# Patient Record
Sex: FEMALE | Race: BLACK OR AFRICAN AMERICAN | NOT HISPANIC OR LATINO | Employment: OTHER | URBAN - METROPOLITAN AREA
[De-identification: names, ages, dates, MRNs, and addresses within clinical notes are randomized per-mention and may not be internally consistent; named-entity substitution may affect disease eponyms.]

---

## 2021-05-19 PROBLEM — Z79.4 TYPE 2 DIABETES MELLITUS WITH CHRONIC KIDNEY DISEASE ON CHRONIC DIALYSIS, WITH LONG-TERM CURRENT USE OF INSULIN: Status: ACTIVE | Noted: 2021-05-19

## 2021-05-19 PROBLEM — N20.0 KIDNEY STONES: Status: ACTIVE | Noted: 2017-10-16

## 2021-05-19 PROBLEM — N18.6 TYPE 2 DIABETES MELLITUS WITH CHRONIC KIDNEY DISEASE ON CHRONIC DIALYSIS, WITH LONG-TERM CURRENT USE OF INSULIN: Status: ACTIVE | Noted: 2021-05-19

## 2021-05-19 PROBLEM — E61.1 IRON DEFICIENCY: Status: ACTIVE | Noted: 2017-08-03

## 2021-05-19 PROBLEM — R80.9 PROTEINURIA: Status: ACTIVE | Noted: 2017-08-03

## 2021-05-19 PROBLEM — E11.22 TYPE 2 DIABETES MELLITUS WITH CHRONIC KIDNEY DISEASE ON CHRONIC DIALYSIS, WITH LONG-TERM CURRENT USE OF INSULIN: Status: ACTIVE | Noted: 2021-05-19

## 2021-05-19 PROBLEM — I10 ESSENTIAL HYPERTENSION: Status: ACTIVE | Noted: 2021-05-19

## 2021-05-19 PROBLEM — N18.6 ESRD (END STAGE RENAL DISEASE): Status: ACTIVE | Noted: 2018-08-01

## 2021-05-19 PROBLEM — N20.1 URETERIC STONE: Status: ACTIVE | Noted: 2021-05-19

## 2021-05-19 PROBLEM — S82.831A CLOSED FRACTURE OF RIGHT DISTAL FIBULA: Status: ACTIVE | Noted: 2021-05-19

## 2021-05-19 PROBLEM — N25.81 SECONDARY HYPERPARATHYROIDISM: Status: ACTIVE | Noted: 2017-08-03

## 2021-05-19 PROBLEM — S82.831A CLOSED FRACTURE OF RIGHT DISTAL FIBULA: Status: RESOLVED | Noted: 2021-05-19 | Resolved: 2021-05-19

## 2021-05-19 PROBLEM — D50.8 IRON DEFICIENCY ANEMIA SECONDARY TO INADEQUATE DIETARY IRON INTAKE: Status: ACTIVE | Noted: 2017-08-03

## 2021-05-19 PROBLEM — S82.841D CLOSED BIMALLEOLAR FRACTURE OF RIGHT ANKLE WITH ROUTINE HEALING: Status: ACTIVE | Noted: 2021-05-19

## 2021-05-19 PROBLEM — Z01.818 ENCOUNTER FOR PREOPERATIVE ASSESSMENT: Status: ACTIVE | Noted: 2021-05-19

## 2021-05-19 PROBLEM — Z99.2 TYPE 2 DIABETES MELLITUS WITH CHRONIC KIDNEY DISEASE ON CHRONIC DIALYSIS, WITH LONG-TERM CURRENT USE OF INSULIN: Status: ACTIVE | Noted: 2021-05-19

## 2021-05-19 PROBLEM — E66.01 MORBID OBESITY: Status: ACTIVE | Noted: 2021-05-19

## 2021-05-20 PROBLEM — E83.39 HYPERPHOSPHATEMIA: Status: ACTIVE | Noted: 2021-05-20

## 2021-05-21 PROBLEM — S82.831A CLOSED FRACTURE OF RIGHT DISTAL FIBULA: Status: ACTIVE | Noted: 2021-05-21

## 2021-05-21 PROBLEM — S82.841A CLOSED BIMALLEOLAR FRACTURE OF RIGHT ANKLE: Status: ACTIVE | Noted: 2021-05-19

## 2021-05-22 PROBLEM — E87.5 HYPERKALEMIA: Status: ACTIVE | Noted: 2021-05-22

## 2021-05-24 PROBLEM — E87.5 HYPERKALEMIA: Status: RESOLVED | Noted: 2021-05-22 | Resolved: 2021-05-24

## 2021-05-24 PROBLEM — R53.83 LETHARGY: Status: ACTIVE | Noted: 2021-05-24

## 2021-05-24 PROBLEM — J96.11 CHRONIC RESPIRATORY FAILURE WITH HYPOXIA: Status: ACTIVE | Noted: 2021-05-24

## 2021-05-24 PROBLEM — S82.831A CLOSED FRACTURE OF RIGHT DISTAL FIBULA: Status: RESOLVED | Noted: 2021-05-21 | Resolved: 2021-05-24

## 2022-02-27 PROBLEM — Z79.899 DVT PROPHYLAXIS: Status: ACTIVE | Noted: 2021-05-19

## 2022-02-27 PROBLEM — R06.02 SOB (SHORTNESS OF BREATH): Status: ACTIVE | Noted: 2022-02-27

## 2022-02-27 PROBLEM — I16.1 HYPERTENSIVE EMERGENCY, NO CHF: Status: ACTIVE | Noted: 2022-02-27

## 2022-02-27 PROBLEM — E83.51 HYPOCALCEMIA: Status: ACTIVE | Noted: 2022-02-27

## 2022-02-27 PROBLEM — E11.9 TYPE 2 DIABETES MELLITUS: Status: ACTIVE | Noted: 2021-05-19

## 2022-03-03 PROBLEM — I16.1 HYPERTENSIVE EMERGENCY: Status: RESOLVED | Noted: 2022-02-27 | Resolved: 2022-03-03

## 2022-03-05 PROBLEM — E87.5 HYPERKALEMIA: Status: RESOLVED | Noted: 2021-05-22 | Resolved: 2022-03-05

## 2022-03-05 PROBLEM — Z79.899 DVT PROPHYLAXIS: Status: RESOLVED | Noted: 2021-05-19 | Resolved: 2022-03-05

## 2022-03-05 PROBLEM — I27.20 PULMONARY HYPERTENSION: Status: ACTIVE | Noted: 2022-03-05

## 2022-03-05 PROBLEM — R06.02 SOB (SHORTNESS OF BREATH): Status: RESOLVED | Noted: 2022-02-27 | Resolved: 2022-03-05

## 2023-09-25 PROBLEM — R09.89 SUSPECTED CEREBROVASCULAR ACCIDENT (CVA): Status: ACTIVE | Noted: 2023-09-25

## 2023-09-25 PROBLEM — E11.9 TYPE 2 DIABETES MELLITUS: Status: ACTIVE | Noted: 2023-09-25

## 2023-09-25 PROBLEM — G25.3 MYOCLONIC JERKING: Status: ACTIVE | Noted: 2023-09-25

## 2023-09-25 PROBLEM — E83.51 HYPOCALCEMIA: Status: ACTIVE | Noted: 2023-09-25

## 2023-09-25 PROBLEM — R41.82 ALTERED MENTAL STATUS: Status: ACTIVE | Noted: 2023-09-25

## 2023-09-25 PROBLEM — N18.6 ESRD (END STAGE RENAL DISEASE): Status: ACTIVE | Noted: 2023-09-25

## 2023-09-26 PROBLEM — E87.29 METABOLIC ACIDOSIS, INCREASED ANION GAP: Status: ACTIVE | Noted: 2023-09-26

## 2023-09-26 PROBLEM — E87.5 HYPERKALEMIA: Status: ACTIVE | Noted: 2023-09-26

## 2023-09-26 PROBLEM — N18.6 ESRD (END STAGE RENAL DISEASE) ON DIALYSIS: Status: ACTIVE | Noted: 2023-09-26

## 2023-09-26 PROBLEM — N19 UREMIA: Status: ACTIVE | Noted: 2023-09-26

## 2023-09-26 PROBLEM — I95.3 HEMODIALYSIS-ASSOCIATED HYPOTENSION: Status: ACTIVE | Noted: 2023-09-26

## 2023-09-26 PROBLEM — Z99.2 ESRD (END STAGE RENAL DISEASE) ON DIALYSIS: Status: ACTIVE | Noted: 2023-09-26

## 2023-09-26 PROBLEM — R56.9 SEIZURE: Status: ACTIVE | Noted: 2023-09-26

## 2023-09-26 PROBLEM — J96.01 ACUTE HYPOXEMIC RESPIRATORY FAILURE: Status: ACTIVE | Noted: 2023-09-26

## 2023-09-27 PROBLEM — G93.41 ACUTE METABOLIC ENCEPHALOPATHY: Status: ACTIVE | Noted: 2023-09-27

## 2023-09-28 PROBLEM — G93.40 ENCEPHALOPATHY: Status: ACTIVE | Noted: 2023-09-28

## 2023-09-28 PROBLEM — R09.89 SUSPECTED CEREBROVASCULAR ACCIDENT (CVA): Status: RESOLVED | Noted: 2023-09-25 | Resolved: 2023-09-28

## 2023-09-29 PROBLEM — Z99.2 ESRD (END STAGE RENAL DISEASE) ON DIALYSIS: Status: ACTIVE | Noted: 2023-09-29

## 2023-09-29 PROBLEM — E83.39 HYPERPHOSPHATEMIA: Status: ACTIVE | Noted: 2023-09-29

## 2023-09-29 PROBLEM — N18.6 ESRD (END STAGE RENAL DISEASE) ON DIALYSIS: Status: ACTIVE | Noted: 2023-09-29

## 2023-09-29 PROBLEM — D63.1 ANEMIA DUE TO CHRONIC KIDNEY DISEASE, ON CHRONIC DIALYSIS: Status: ACTIVE | Noted: 2023-09-26

## 2023-09-30 PROBLEM — D63.1 ANEMIA IN ESRD (END-STAGE RENAL DISEASE): Status: ACTIVE | Noted: 2023-09-30

## 2023-09-30 PROBLEM — N18.6 ANEMIA IN ESRD (END-STAGE RENAL DISEASE): Status: ACTIVE | Noted: 2023-09-30

## 2023-09-30 PROBLEM — R06.02 SOB (SHORTNESS OF BREATH): Status: ACTIVE | Noted: 2023-09-30

## 2023-09-30 PROBLEM — I10 ESSENTIAL HYPERTENSION: Status: ACTIVE | Noted: 2023-09-30

## 2023-09-30 PROBLEM — N25.81 SECONDARY HYPERPARATHYROIDISM OF RENAL ORIGIN: Status: ACTIVE | Noted: 2023-09-30

## 2023-10-01 PROBLEM — E87.1 HYPONATREMIA: Status: ACTIVE | Noted: 2023-10-01

## 2023-10-02 PROBLEM — J18.9 HOSPITAL ACQUIRED PNA: Status: ACTIVE | Noted: 2023-10-02
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
## 2023-10-04 PROBLEM — E87.1 HYPONATREMIA: Status: RESOLVED | Noted: 2023-10-01 | Resolved: 2023-10-04

## 2023-10-04 PROBLEM — E83.51 HYPOCALCEMIA: Status: RESOLVED | Noted: 2023-09-25 | Resolved: 2023-10-04

## 2023-10-04 PROBLEM — J96.01 ACUTE HYPOXEMIC RESPIRATORY FAILURE: Status: RESOLVED | Noted: 2023-09-26 | Resolved: 2023-10-04

## 2023-10-04 PROBLEM — E87.29 METABOLIC ACIDOSIS, INCREASED ANION GAP: Status: RESOLVED | Noted: 2023-09-26 | Resolved: 2023-10-04

## 2023-10-04 PROBLEM — G93.40 ACUTE ENCEPHALOPATHY: Status: ACTIVE | Noted: 2023-09-25

## 2023-10-04 PROBLEM — R41.82 ALTERED MENTAL STATUS: Status: ACTIVE | Noted: 2023-10-04

## 2023-10-04 PROBLEM — N19 UREMIA: Status: RESOLVED | Noted: 2023-09-26 | Resolved: 2023-10-04

## 2023-10-05 PROBLEM — R53.81 PHYSICAL DECONDITIONING: Status: ACTIVE | Noted: 2023-10-05

## 2023-10-06 PROBLEM — J96.01 ACUTE HYPOXEMIC RESPIRATORY FAILURE: Status: ACTIVE | Noted: 2023-10-02

## 2023-10-06 PROBLEM — J18.9 HOSPITAL-ACQUIRED PNEUMONIA: Status: ACTIVE | Noted: 2023-10-06
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
## 2023-10-07 PROBLEM — I63.9 CEREBROVASCULAR ACCIDENT (CVA): Status: ACTIVE | Noted: 2023-10-07

## 2023-10-10 PROBLEM — R41.82 ALTERED MENTAL STATUS: Status: RESOLVED | Noted: 2023-10-04 | Resolved: 2023-10-10
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
## 2023-10-13 PROBLEM — J18.9 HOSPITAL-ACQUIRED PNEUMONIA: Status: RESOLVED | Noted: 2023-10-06 | Resolved: 2023-10-13
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
## 2023-10-13 PROBLEM — G93.40 ACUTE ENCEPHALOPATHY: Status: RESOLVED | Noted: 2023-09-25 | Resolved: 2023-10-13

## 2023-10-13 PROBLEM — I95.3 HEMODIALYSIS-ASSOCIATED HYPOTENSION: Status: RESOLVED | Noted: 2023-09-26 | Resolved: 2023-10-13

## 2023-10-13 PROBLEM — E87.5 HYPERKALEMIA: Status: RESOLVED | Noted: 2023-09-26 | Resolved: 2023-10-13

## 2023-10-13 PROBLEM — G93.41 ACUTE METABOLIC ENCEPHALOPATHY: Status: RESOLVED | Noted: 2023-09-27 | Resolved: 2023-10-13

## 2023-10-13 PROBLEM — J96.01 ACUTE HYPOXEMIC RESPIRATORY FAILURE: Status: RESOLVED | Noted: 2023-10-02 | Resolved: 2023-10-13

## 2023-10-13 PROBLEM — R06.02 SOB (SHORTNESS OF BREATH): Status: RESOLVED | Noted: 2023-09-30 | Resolved: 2023-10-13

## 2023-10-13 PROBLEM — J18.9 HOSPITAL ACQUIRED PNA: Status: ACTIVE | Noted: 2023-10-13

## 2023-10-18 PROBLEM — T82.590A DIALYSIS AV FISTULA MALFUNCTION: Status: ACTIVE | Noted: 2023-10-18

## 2024-01-08 PROBLEM — I63.9 CEREBROVASCULAR ACCIDENT (CVA): Status: RESOLVED | Noted: 2023-10-07 | Resolved: 2024-01-08

## 2024-01-15 PROBLEM — J18.9 HOSPITAL ACQUIRED PNA: Status: RESOLVED | Noted: 2023-10-13 | Resolved: 2024-01-15
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
## 2024-03-06 PROBLEM — T82.868A AV FISTULA THROMBOSIS, INITIAL ENCOUNTER: Status: ACTIVE | Noted: 2024-03-06

## 2024-03-09 PROBLEM — E87.5 HYPERKALEMIA: Status: ACTIVE | Noted: 2024-03-09

## 2024-03-09 PROBLEM — E87.0 HYPERNATREMIA: Status: ACTIVE | Noted: 2024-03-09

## 2024-03-09 PROBLEM — E66.9 CLASS 1 OBESITY: Status: ACTIVE | Noted: 2024-03-09

## 2024-03-09 PROBLEM — I16.0 HYPERTENSIVE URGENCY: Status: ACTIVE | Noted: 2024-03-09

## 2024-03-09 PROBLEM — E66.811 CLASS 1 OBESITY: Status: ACTIVE | Noted: 2024-03-09

## 2024-04-15 PROBLEM — F79 INTELLECTUAL DISABILITY: Status: ACTIVE | Noted: 2024-04-15

## 2024-04-16 PROBLEM — S00.03XA CONTUSION OF SCALP: Status: ACTIVE | Noted: 2024-04-16

## 2024-04-16 PROBLEM — E87.20 METABOLIC ACIDOSIS: Status: ACTIVE | Noted: 2024-04-16

## 2024-04-22 PROBLEM — G93.41 ENCEPHALOPATHY, METABOLIC: Status: RESOLVED | Noted: 2023-09-27 | Resolved: 2024-04-22

## 2024-04-23 PROBLEM — S00.03XA CONTUSION OF SCALP: Status: RESOLVED | Noted: 2024-04-16 | Resolved: 2024-04-23

## 2024-04-23 PROBLEM — E87.5 HYPERKALEMIA: Status: RESOLVED | Noted: 2024-03-09 | Resolved: 2024-04-23

## 2024-04-23 PROBLEM — E87.20 METABOLIC ACIDOSIS: Status: RESOLVED | Noted: 2024-04-16 | Resolved: 2024-04-23

## 2024-05-02 PROBLEM — E66.01 SEVERE OBESITY (BMI >= 40): Status: ACTIVE | Noted: 2024-05-02

## 2024-05-02 PROBLEM — I50.32 CHRONIC DIASTOLIC HEART FAILURE: Status: ACTIVE | Noted: 2024-05-02

## 2024-05-02 PROBLEM — J96.21 ACUTE ON CHRONIC RESPIRATORY FAILURE WITH HYPOXIA: Status: ACTIVE | Noted: 2021-05-24

## 2024-05-02 PROBLEM — T82.7XXA: Status: ACTIVE | Noted: 2024-05-02

## 2024-05-07 PROBLEM — J96.21 ACUTE ON CHRONIC RESPIRATORY FAILURE WITH HYPOXIA: Status: RESOLVED | Noted: 2021-05-24 | Resolved: 2024-05-07

## 2024-05-11 PROBLEM — J96.21 ACUTE ON CHRONIC RESPIRATORY FAILURE WITH HYPOXIA: Status: RESOLVED | Noted: 2021-05-24 | Resolved: 2024-05-11

## 2024-08-14 PROBLEM — T82.898A PROBLEM WITH DIALYSIS ACCESS: Status: ACTIVE | Noted: 2024-08-14

## 2024-09-03 ENCOUNTER — PATIENT OUTREACH (OUTPATIENT)
Dept: ADMINISTRATIVE | Facility: HOSPITAL | Age: 49
End: 2024-09-03

## 2024-09-03 PROBLEM — G93.40 ACUTE ENCEPHALOPATHY: Status: RESOLVED | Noted: 2023-09-25 | Resolved: 2024-09-03

## 2024-09-03 PROBLEM — E83.51 HYPOCALCEMIA: Status: RESOLVED | Noted: 2022-02-27 | Resolved: 2024-09-03

## 2024-09-03 PROBLEM — E66.01 CLASS 2 SEVERE OBESITY WITH SERIOUS COMORBIDITY AND BODY MASS INDEX (BMI) OF 38.0 TO 38.9 IN ADULT: Status: ACTIVE | Noted: 2024-09-03

## 2024-09-03 PROBLEM — E66.812 CLASS 2 SEVERE OBESITY WITH SERIOUS COMORBIDITY AND BODY MASS INDEX (BMI) OF 38.0 TO 38.9 IN ADULT: Status: ACTIVE | Noted: 2024-09-03

## 2024-09-03 PROBLEM — S82.841A CLOSED BIMALLEOLAR FRACTURE OF RIGHT ANKLE: Status: RESOLVED | Noted: 2021-05-19 | Resolved: 2024-09-03

## 2024-09-03 PROBLEM — E11.9 TYPE 2 DIABETES MELLITUS: Status: RESOLVED | Noted: 2023-09-25 | Resolved: 2024-09-03

## 2024-09-04 PROBLEM — R07.9 CHEST PAIN: Status: RESOLVED | Noted: 2024-09-04 | Resolved: 2024-09-04

## 2024-09-04 PROBLEM — Z99.2 DIALYSIS PATIENT: Status: ACTIVE | Noted: 2024-09-04

## 2024-09-04 PROBLEM — R07.9 CHEST PAIN: Status: ACTIVE | Noted: 2024-09-04

## 2024-09-05 PROBLEM — D69.6 THROMBOCYTOPENIA: Status: ACTIVE | Noted: 2024-09-05

## 2024-09-05 PROBLEM — E83.51 HYPOCALCEMIA: Status: ACTIVE | Noted: 2024-09-05

## 2024-09-07 PROBLEM — D72.10 EOSINOPHILIA: Status: ACTIVE | Noted: 2024-09-07

## 2024-09-10 PROBLEM — E87.5 HYPERKALEMIA: Status: RESOLVED | Noted: 2021-05-22 | Resolved: 2024-09-10

## 2024-11-20 PROBLEM — T82.49XA: Status: ACTIVE | Noted: 2024-11-20

## 2024-11-20 PROBLEM — Z49.01 ENCOUNTER FOR FITTING AND ADJUSTMENT OF DIALYSIS CATHETER: Status: ACTIVE | Noted: 2024-11-20

## 2024-11-20 PROBLEM — I87.1 INFERIOR VENA CAVAL STENOSIS: Status: ACTIVE | Noted: 2024-11-20

## 2025-01-06 PROBLEM — E16.2 HYPOGLYCEMIA: Status: ACTIVE | Noted: 2025-01-06

## 2025-01-07 PROBLEM — E87.5 HYPERKALEMIA: Status: ACTIVE | Noted: 2025-01-07

## 2025-01-07 PROBLEM — D64.9 ANEMIA: Status: ACTIVE | Noted: 2025-01-07

## 2025-01-07 PROBLEM — E87.1 HYPONATREMIA: Status: ACTIVE | Noted: 2025-01-07

## 2025-01-07 PROBLEM — I15.1 HYPERTENSION SECONDARY TO OTHER RENAL DISORDERS: Status: ACTIVE | Noted: 2021-05-19

## 2025-01-07 PROBLEM — E66.9 OBESITY: Status: ACTIVE | Noted: 2025-01-07

## 2025-01-09 PROBLEM — R14.0 ABDOMINAL DISTENSION: Status: ACTIVE | Noted: 2025-01-09

## 2025-01-10 PROBLEM — I15.1 HYPERTENSION SECONDARY TO OTHER RENAL DISORDERS: Status: RESOLVED | Noted: 2021-05-19 | Resolved: 2025-01-10

## 2025-01-10 PROBLEM — K85.90 ACUTE PANCREATITIS: Status: ACTIVE | Noted: 2025-01-10

## 2025-02-25 ENCOUNTER — OUTPATIENT CASE MANAGEMENT (OUTPATIENT)
Dept: ADMINISTRATIVE | Facility: OTHER | Age: 50
End: 2025-02-25

## 2025-02-26 ENCOUNTER — OUTPATIENT CASE MANAGEMENT (OUTPATIENT)
Dept: ADMINISTRATIVE | Facility: OTHER | Age: 50
End: 2025-02-26

## 2025-02-26 NOTE — PROGRESS NOTES
Outpatient Care Management   - Patient Assessment    Patient: Gabbi Francois  MRN:  [id number]  Date of Service:  2/26/2025  Completed by:  Simona Interiano LMSW  Referral Date:     Reason for Visit   Patient presents with    OPCM Enrollment Call    Social Work Assessment       Brief Summary:  received a referral from outpatient provider for the following Low/Mod SW psychosocial needs help with securing transportation for all doctor appointments.  The patient also requests assistance with securing appointments for hearing assessment. Care plan was created in collaboration with patient/caregiver input.   completed the SDOH questionnaire, EMERSON& and PHQ.  Simona Interiano-MAGALY  -Social Service-ACC

## 2025-02-28 NOTE — PROGRESS NOTES
Outpatient Care Management   - Care Plan Follow Up    Patient: Gabbi Francois  MRN:  [id number]  Date of Service:  2/28/2025 ( 02-27-25)  Completed by:  Simona Norwood LMSW  Referral Date:     Reason for Visit   Patient presents with    OPCM SW Follow Up Call     Unsuccessful call placed to caregiver at [phone], no message could be left.       Brief Summary: Unsuccessful call placed on (02-27-25) to caregiver @ [phone], no answer, no message could be left. Unsuccessful call placed to caregiver this AM on 02-28-25.  (JAVIER) proceeded with call to  [facility] @ [phone] for the purpose of scheduling transportation request within the recommended time, in order for the request to be approved.  JAVIER spoke with agent (Alicia) who scheduled the following :Patient to be picked from her residence of [address] [place] [address] @ 8:25 ( patient encouraged to be available for pick-up 15 min before or 15 min after scheduled pick-up time); patient will be accompanied by caregiver (Gwen Oswald/ number listed above); the return trip is a will call request. The reference number for this call is ([id number]) The number to call for patients return home is [phone].  Efforts to reach caregiver to provide this update will continue.    Complex Care Plan     Care plan was discussed and completed today with input from patient and/or caregiver.    Patient Instructions     No follow-ups on file.

## 2025-03-03 ENCOUNTER — OUTPATIENT CASE MANAGEMENT (OUTPATIENT)
Dept: ADMINISTRATIVE | Facility: OTHER | Age: 50
End: 2025-03-03

## 2025-03-07 ENCOUNTER — OUTPATIENT CASE MANAGEMENT (OUTPATIENT)
Dept: ADMINISTRATIVE | Facility: OTHER | Age: 50
End: 2025-03-07

## 2025-03-07 NOTE — PROGRESS NOTES
03-07-25 Referral received from [facility] (Fanta Hunt) requesting transportation for 03-27-25 for AVF creation with Dr. Monteiro, and a Post-OP on 04-17-25 @ 10:00 AM.    Patient is scheduled to be admitted at 5:00 AM, on  [facility] . Call placed to [facility] @ [phone], transportation booked for patient to be picked-up from her residence of [address] [place] [address]. [phone]@ 3:45 AM.on 03-27-25. Post-op transportation request can be booked until after discharge. This transportation request is a will call request.  [place] for this request is [id number].  Unsuccessful effort to reach caregiver to provide this update.  Simona Interiano-[facility]  Uirkxno-[phone]

## 2025-03-31 ENCOUNTER — OUTPATIENT CASE MANAGEMENT (OUTPATIENT)
Dept: ADMINISTRATIVE | Facility: OTHER | Age: 50
End: 2025-03-31

## 2025-03-31 NOTE — PROGRESS NOTES
SW made phone call to [employer]([phone])regarding confirming pt date and  time. MURTAZA spoke with Ruthy with [employer] transportation to confirm date/ time for pt. Ruthy informed MURTAZA that pt  time has already being schedule by Simona Norwood LMSW. Ruthy informed MURTAZA pt appointment is for 04/17/2025 at 9:00 am/ time is 7:45 am at pt residence:[address] [place] [address] and brought to [address] [place] [address]. SW will continue to follow up for any needs or concerns.

## 2025-04-14 PROBLEM — R10.84 GENERALIZED ABDOMINAL PAIN: Status: ACTIVE | Noted: 2025-04-14

## 2025-04-15 PROBLEM — R10.84 GENERALIZED ABDOMINAL PAIN: Status: RESOLVED | Noted: 2025-04-14 | Resolved: 2025-04-15

## 2025-04-15 PROBLEM — E55.9 VITAMIN D DEFICIENCY DUE TO CHRONIC KIDNEY DISEASE: Status: ACTIVE | Noted: 2025-04-15

## 2025-04-15 PROBLEM — N18.9 VITAMIN D DEFICIENCY DUE TO CHRONIC KIDNEY DISEASE: Status: ACTIVE | Noted: 2025-04-15
